# Patient Record
Sex: MALE | Race: WHITE | URBAN - METROPOLITAN AREA
[De-identification: names, ages, dates, MRNs, and addresses within clinical notes are randomized per-mention and may not be internally consistent; named-entity substitution may affect disease eponyms.]

---

## 2020-08-31 ENCOUNTER — VIRTUAL VISIT (OUTPATIENT)
Dept: NEUROLOGY | Facility: CLINIC | Age: 25
End: 2020-08-31
Payer: COMMERCIAL

## 2020-08-31 DIAGNOSIS — G93.32 CHRONIC FATIGUE SYNDROME: Primary | ICD-10-CM

## 2020-08-31 RX ORDER — FLUTICASONE PROPIONATE 50 MCG
1 SPRAY, SUSPENSION (ML) NASAL DAILY
COMMUNITY
Start: 2019-09-06

## 2020-08-31 RX ORDER — MAG HYDROX/ALUMINUM HYD/SIMETH 400-400-40
1 SUSPENSION, ORAL (FINAL DOSE FORM) ORAL DAILY
COMMUNITY

## 2020-08-31 RX ORDER — LEVOMEFOLATE CALCIUM 7.5 MG TABLET
1 TABLET DAILY
COMMUNITY

## 2020-08-31 RX ORDER — VIT C/B6/B5/MAGNESIUM/HERB 173 50-5-6-5MG
2 CAPSULE ORAL DAILY
COMMUNITY

## 2020-08-31 NOTE — PROGRESS NOTES
"Macario Verdugo is a 24 year old male who is being evaluated via a billable video visit.      The patient has been notified of following:     \"This video visit will be conducted via a call between you and your physician/provider. We have found that certain health care needs can be provided without the need for an in-person physical exam.  This service lets us provide the care you need with a video conversation.  If a prescription is necessary we can send it directly to your pharmacy.  If lab work is needed we can place an order for that and you can then stop by our lab to have the test done at a later time.    Video visits are billed at different rates depending on your insurance coverage.  Please reach out to your insurance provider with any questions.    If during the course of the call the physician/provider feels a video visit is not appropriate, you will not be charged for this service.\"    Patient has given verbal consent for Video visit? YES  How would you like to obtain your AVS? MyChart  Will anyone else be joining your video visit? no        Video-Visit Details    Type of service:  Video Visit    Video Start Time: 9.46 am  Video End Time: 10.00 am    Originating Location (pt. Location): Home    Distant Location (provider location):  [facility] NEUROLOGY     Platform used for Video Visit: Fausto Moya EMT          "

## 2020-08-31 NOTE — PROGRESS NOTES
Service Date: 2020      Yonatan Mata. MD [facility] [place]    [address]    [place] [address]      RE: Macario Verdugo    MRN: [id number]   : 1995      Dear Dr. Mata:      I had the pleasure to evaluate Macario Verdugo via a billable video visit today. In-person visit was not recommended due to COVID-19.  Ben is a 24-year-old man whom I last saw 5 years ago.  At that time, I diagnosed him with chronic fatigue syndrome/systemic exertion intolerance disease.  His history is outlined in my 2015 note.  Problems began with muscle pain, exercise intolerance and fatigue after a flu-like illness in 2011.  Since then, his symptoms have been progressive and increasingly disabling.  He has had extensive evaluations by several neurologists, including CK, EMG and multiple labs, that did not show anything notable, and his physical exam I performed in 2015 was completely normal from a neuromuscular standpoint.  We also did an ischemic forearm exercise test in 2015 that was normal, indicating no sign of metabolic myopathy.    Ben's condition has progressed since .  For a year after I last saw him, he was able to go to [employer] and complete it, but then he gradually became weaker and more fatigued.  He now walks around his house with a cane or holding onto the furniture or walls.  He also has a wheelchair and handicapped parking permit.  His heart rate is around 100 at rest and increases with any exertion, and he feels very uncomfortable.  He has not had any syncopal episodes.  Dyspnea is not a major component of his illness.  He denies any orthopnea.  He does not have any trouble swallowing.  At this point, he is totally incapacitated and unable to work.  He fatigues even with going from one room to the other in his house.  His legs have a tendency to collapse when he walks.  He at this point does not have any symptoms of anxiety. He mentions to me  "that he can't read a whole page from a book or laptop computer because his \"brain fatigues\" and feels like \"swollen from inside\" meaning that he is unable to concentrate and process the information.     MEDICATIONS:  Reviewed and are as per Epic record.      In summary, Ben has systemic exertion intolerance disease/chronic fatigue syndrome.  This is a well-recognized condition affecting about 2 million Americans, for which research is currently not adequate. He was evaluated by another specialist in 2017 and serologies for HHV-6 and EBV were ordered which showed high positive titers. However, I explained to him that we can't make a clear causative link between those antibodies and chronic fatigue, exactly because those viral infections are very prevalent in the general population, and up to 70-80% of otherwise healthy people have IgG antibodies against those viruses. I told the patient am happy to document his disability, and it is clear to me that this person cannot have any meaningful employment at this point of time.  This letter can be used for SSDI application.      I also explained to him, however, that I do not specialize in the treatment of chronic fatigue syndrome because I do not understand its mechanisms, and this is not a primary neuromuscular disorder.  He understands that, and I will see him in followup as needed.     Total time spent on video call was 14 minutes.  Start 9:46 a.m., end 10:00 a.m.  More than half was counseling.     Sincerely,      MD CATERINA Lizarraga MD             D: 2020   T: 2020   MT: abdelrahman      Name:     SCOOBY GUPTA   MRN:      [id number]        Account:      [id number]   :      1995           Service Date: 2020      Document: [id number]      "

## 2020-08-31 NOTE — LETTER
"2020     RE: Macario Verdugo  [address]  [facility] [address]     Dear Colleague,    Thank you for referring your patient, Macario Verdugo, to the [facility] NEUROLOGY at [facility]. Please see a copy of my visit note below.    Macario Verdugo is a 24 year old male who is being evaluated via a billable video visit.      The patient has been notified of following:     \"This video visit will be conducted via a call between you and your physician/provider. We have found that certain health care needs can be provided without the need for an in-person physical exam.  This service lets us provide the care you need with a video conversation.  If a prescription is necessary we can send it directly to your pharmacy.  If lab work is needed we can place an order for that and you can then stop by our lab to have the test done at a later time.    Video visits are billed at different rates depending on your insurance coverage.  Please reach out to your insurance provider with any questions.    If during the course of the call the physician/provider feels a video visit is not appropriate, you will not be charged for this service.\"    Patient has given verbal consent for Video visit? YES  How would you like to obtain your AVS? MyChart  Will anyone else be joining your video visit? no        Video-Visit Details    Type of service:  Video Visit    Video Start Time: 9.46 am  Video End Time: 10.00 am    Originating Location (pt. Location): Home    Distant Location (provider location):  [facility] NEUROLOGY     Platform used for Video Visit: MARIBEL Benjamin            Service Date: 2020      Yonatan Mata. MD [facility]    [address] [place] [address]      RE: Macario Verdugo    MRN: [id number]   : 1995      Dear Dr. Mata:      I had the pleasure to evaluate Macario Verdugo via a billable video visit today. " "In-person visit was not recommended due to COVID-19.  Ben is a 24-year-old man whom I last saw 5 years ago.  At that time, I diagnosed him with chronic fatigue syndrome/systemic exertion intolerance disease.  His history is outlined in my 2015 note.  Problems began with muscle pain, exercise intolerance and fatigue after a flu-like illness in 11/2011.  Since then, his symptoms have been progressive and increasingly disabling.  He has had extensive evaluations by several neurologists, including CK, EMG and multiple labs, that did not show anything notable, and his physical exam I performed in 08/2015 was completely normal from a neuromuscular standpoint.  We also did an ischemic forearm exercise test in 08/2015 that was normal, indicating no sign of metabolic myopathy.    Ben's condition has progressed since 2015.  For a year after I last saw him, he was able to go to [employer] and complete it, but then he gradually became weaker and more fatigued.  He now walks around his house with a cane or holding onto the furniture or walls.  He also has a wheelchair and handicapped parking permit.  His heart rate is around 100 at rest and increases with any exertion, and he feels very uncomfortable.  He has not had any syncopal episodes.  Dyspnea is not a major component of his illness.  He denies any orthopnea.  He does not have any trouble swallowing.  At this point, he is totally incapacitated and unable to work.  He fatigues even with going from one room to the other in his house.  His legs have a tendency to collapse when he walks.  He at this point does not have any symptoms of anxiety. He mentions to me that he can't read a whole page from a book or laptop computer because his \"brain fatigues\" and feels like \"swollen from inside\" meaning that he is unable to concentrate and process the information.     MEDICATIONS:  Reviewed and are as per Epic record.      In summary, Ben has systemic exertion intolerance " disease/chronic fatigue syndrome.  This is a well-recognized condition affecting about 2 million Americans, for which research is currently not adequate. He was evaluated by another specialist in 2017 and serologies for HHV-6 and EBV were ordered which showed high positive titers. However, I explained to him that we can't make a clear causative link between those antibodies and chronic fatigue, exactly because those viral infections are very prevalent in the general population, and up to 70-80% of otherwise healthy people have IgG antibodies against those viruses. I told the patient am happy to document his disability, and it is clear to me that this person cannot have any meaningful employment at this point of time.  This letter can be used for SSDI application.      I also explained to him, however, that I do not specialize in the treatment of chronic fatigue syndrome because I do not understand its mechanisms, and this is not a primary neuromuscular disorder.  He understands that, and I will see him in followup as needed.     Total time spent on video call was 14 minutes.  Start 9:46 a.m., end 10:00 a.m.  More than half was counseling.     Sincerely,      Leoncio Bryan MD       D: 2020   T: 2020   MT: abdelrahman    Name:     SCOOBY GUPTA   MRN:      [id number]        Account:      [id number]   :      1995           Service Date: 2020    Document: [id number]

## 2020-09-11 ENCOUNTER — MYC MEDICAL ADVICE (OUTPATIENT)
Dept: NEUROLOGY | Facility: CLINIC | Age: 25
End: 2020-09-11

## 2020-09-11 NOTE — TELEPHONE ENCOUNTER
Dimple-please contact family and send my latest clinic note. I do not know the fax number/name of person that this needs to be cc'ed to- please find out. Thank you

## 2020-12-06 ENCOUNTER — HEALTH MAINTENANCE LETTER (OUTPATIENT)
Age: 25
End: 2020-12-06

## 2021-09-25 ENCOUNTER — HEALTH MAINTENANCE LETTER (OUTPATIENT)
Age: 26
End: 2021-09-25

## 2022-01-15 ENCOUNTER — HEALTH MAINTENANCE LETTER (OUTPATIENT)
Age: 27
End: 2022-01-15

## 2023-01-07 ENCOUNTER — HEALTH MAINTENANCE LETTER (OUTPATIENT)
Age: 28
End: 2023-01-07

## 2023-04-22 ENCOUNTER — HEALTH MAINTENANCE LETTER (OUTPATIENT)
Age: 28
End: 2023-04-22